# Patient Record
Sex: MALE | Race: WHITE | Employment: OTHER | URBAN - METROPOLITAN AREA
[De-identification: names, ages, dates, MRNs, and addresses within clinical notes are randomized per-mention and may not be internally consistent; named-entity substitution may affect disease eponyms.]

---

## 2021-12-09 ENCOUNTER — OFFICE VISIT (OUTPATIENT)
Dept: INFECTIOUS DISEASES | Age: 81
End: 2021-12-09
Payer: MEDICARE

## 2021-12-09 VITALS
DIASTOLIC BLOOD PRESSURE: 76 MMHG | OXYGEN SATURATION: 99 % | TEMPERATURE: 97.9 F | HEART RATE: 83 BPM | SYSTOLIC BLOOD PRESSURE: 148 MMHG | WEIGHT: 186.8 LBS

## 2021-12-09 DIAGNOSIS — M05.79 RHEUMATOID ARTHRITIS INVOLVING MULTIPLE SITES WITH POSITIVE RHEUMATOID FACTOR (HCC): ICD-10-CM

## 2021-12-09 DIAGNOSIS — Z79.620 INFLIXIMAB (REMICADE) LONG-TERM USE: ICD-10-CM

## 2021-12-09 DIAGNOSIS — Z22.7 TB LUNG, LATENT: Primary | ICD-10-CM

## 2021-12-09 PROCEDURE — G8427 DOCREV CUR MEDS BY ELIG CLIN: HCPCS | Performed by: INTERNAL MEDICINE

## 2021-12-09 PROCEDURE — 4040F PNEUMOC VAC/ADMIN/RCVD: CPT | Performed by: INTERNAL MEDICINE

## 2021-12-09 PROCEDURE — 1036F TOBACCO NON-USER: CPT | Performed by: INTERNAL MEDICINE

## 2021-12-09 PROCEDURE — G8421 BMI NOT CALCULATED: HCPCS | Performed by: INTERNAL MEDICINE

## 2021-12-09 PROCEDURE — G8484 FLU IMMUNIZE NO ADMIN: HCPCS | Performed by: INTERNAL MEDICINE

## 2021-12-09 PROCEDURE — 99203 OFFICE O/P NEW LOW 30 MIN: CPT | Performed by: INTERNAL MEDICINE

## 2021-12-09 PROCEDURE — 1123F ACP DISCUSS/DSCN MKR DOCD: CPT | Performed by: INTERNAL MEDICINE

## 2021-12-09 RX ORDER — VALACYCLOVIR HYDROCHLORIDE 500 MG/1
500 TABLET, FILM COATED ORAL DAILY
COMMUNITY
Start: 2021-08-30

## 2021-12-09 RX ORDER — ZINC GLUCONATE 50 MG
50 TABLET ORAL DAILY
COMMUNITY

## 2021-12-09 RX ORDER — ASPIRIN 81 MG/1
81 TABLET ORAL DAILY
COMMUNITY

## 2021-12-09 RX ORDER — SITAGLIPTIN 100 MG/1
TABLET, FILM COATED ORAL
COMMUNITY
Start: 2021-09-20

## 2021-12-09 RX ORDER — CANDESARTAN 4 MG/1
TABLET ORAL
COMMUNITY
Start: 2021-09-20

## 2021-12-09 RX ORDER — EZETIMIBE 10 MG/1
10 TABLET ORAL DAILY
COMMUNITY
Start: 2021-09-20

## 2021-12-09 RX ORDER — OMEGA-3S/DHA/EPA/FISH OIL/D3 300MG-1000
400 CAPSULE ORAL DAILY
COMMUNITY

## 2021-12-09 NOTE — PROGRESS NOTES
[facility] - Initial Office Consult Note  Today's Date and Time: 12/192021, 3:55 PM    Diagnostic Impression :     1. TB lung, latent    2. Rheumatoid arthritis involving multiple sites with positive rheumatoid factor (HCC)    3. Infliximab (Remicade) long-term use         Recommendations   · Will review laboratory data and X rays from the [facility]  · Will most likely need  mg q day x 9 months and pyridoxine 50 mg a day for 9 months. · LFT as a baseline and repeat one month into treatment. · INH and pyridoxine requested x 9 months after review of CXR on 12-14-21    Chief complaint/reason for consultation:     Chief Complaint   [facility]   [address]     [facility]/ new patient        History of Present Illness:   Nolberto Caraballo is a [de-identified]y.o.-year-old  male who was initially evaluated on 12/9/2021. Patient seen at the request of his rheumatologist, Dr. Kaylee Bello. The patient indicates that he suffers from Rheumatoid arthritis. He was treated by Dr Lia Silverman for many years until Dr Lia Silverman retired. He has received Remicade for 7 years which has stabilized his arthritis. He denied any active complaints. Dr Kaylee Bello assumed his care and requested a TB quantiferon test which reportedly came back positive. This raised concern for latent tuberculosis. A CXR was done at the [facility]. The results are not available at this point. On evaluation he denied active fever, shortness of breath, chills, weakness, fatigue, loss of appetite, weight loss or any other problems. Exam is normal except for rheumatoid deformities. I discussed with the patient and his wife latent TB, the possibility of breakthrough TB because of the immunosuppressive effect of Remicade and the decrease in breakthrough infection by 7 fold with INH x 9 months. Will await laboratory and Chest X ray results from [facility] clinic to confirm latent TB.  Then obtain liver function tests as baseline before starting INH and pyridoxine. I have personally reviewed the past medical history, past surgical history, medications, social history, and family history, and I have updated the database accordingly. Past Medical History:   History reviewed. No pertinent past medical history. Past Surgical  History:   History reviewed. No pertinent surgical history. Medications:     Current Outpatient Medications:     metFORMIN (GLUCOPHAGE) 1000 MG tablet, Take 1,000 mg by mouth 2 times daily (with meals), Disp: , Rfl:     valACYclovir (VALTREX) 500 MG tablet, Take 500 mg by mouth daily, Disp: , Rfl:     JANUVIA 100 MG tablet, , Disp: , Rfl:     candesartan (ATACAND) 4 MG tablet, , Disp: , Rfl:     ezetimibe (ZETIA) 10 MG tablet, Take 10 mg by mouth daily, Disp: , Rfl:     zinc gluconate 50 MG tablet, Take 50 mg by mouth daily, Disp: , Rfl:     aspirin 81 MG EC tablet, Take 81 mg by mouth daily, Disp: , Rfl:     vitamin D3 (CHOLECALCIFEROL) 10 MCG (400 UNIT) TABS tablet, Take 400 Units by mouth daily, Disp: , Rfl:      Social History:     Social History     Socioeconomic History    Marital status:      Spouse name: Not on file    Number of children: Not on file    Years of education: Not on file    Highest education level: Not on file   Occupational History    Not on file   Tobacco Use    Smoking status: Never Smoker    Smokeless tobacco: Never Used   Substance and Sexual Activity    Alcohol use: Not on file    Drug use: Not on file    Sexual activity: Not on file   Other Topics Concern    Not on file   Social History Narrative    Not on file     Social Determinants of Health     Financial Resource Strain:     Difficulty of Paying Living Expenses: Not on file   Food Insecurity:     Worried About Running Out of Food in the Last Year: Not on file    Alecia of Food in the Last Year: Not on file   Transportation Needs:     Lack of Transportation (Medical):  Not on file    Lack of Transportation (Non-Medical): Not on file   Physical Activity:     Days of Exercise per Week: Not on file    Minutes of Exercise per Session: Not on file   Stress:     Feeling of Stress : Not on file   Social Connections:     Frequency of Communication with Friends and Family: Not on file    Frequency of Social Gatherings with Friends and Family: Not on file    Attends Restorationist Services: Not on file    Active Member of [address] [place] [address] or Organizations: Not on file    Attends Club or Organization Meetings: Not on file    Marital Status: Not on file   Intimate Partner Violence:     Fear of Current or Ex-Partner: Not on file    Emotionally Abused: Not on file    Physically Abused: Not on file    Sexually Abused: Not on file   Housing Stability:     Unable to Pay for Housing in the Last Year: Not on file    Number of [place] in the Last Year: Not on file    Unstable Housing in the Last Year: Not on file       Family History:   History reviewed. No pertinent family history. Allergies:   Lansoprazole, Levothyroxine, and Statins     Review of Systems:   Constitutional: No fevers or chills. No systemic complaints  Head: No headaches  Eyes: No double vision or blurry vision. ENT: No sore throat or runny nose. . No hearing loss, tinnitus or vertigo. Cardiovascular: No chest pain or palpitations. No shortness of breath. No OLIVERA  Lung: No shortness of breath or cough. No sputum production  Abdomen: No nausea, vomiting, diarrhea, or abdominal pain. .  Genitourinary: No increased urinary frequency, or dysuria. No hematuria. No suprapubic or CVA pain  Musculoskeletal: No muscle aches or pains. No joint effusions, swelling or deformities. Hx Rheumatoid arthritis. Hematologic: No bleeding or bruising. Neurologic: No headache, weakness, numbness, or tingling.     Physical Examination :   BP (!) 148/76   Pulse 83   Temp 97.9 °F (36.6 °C) (Temporal)   Wt 186 lb 12.8 oz (84.7 kg)   SpO2 99% Comment: rm air at

## 2021-12-10 NOTE — PATIENT INSTRUCTIONS
Dr Wilmar Vang requested that we get CXR and CT from [facility] clinic I called the patient letting the wife know that they need to sign a release form before we can proceed.  She stated understanding and will inform her  of this RD 12-10-21

## 2021-12-13 ENCOUNTER — TELEPHONE (OUTPATIENT)
Dept: INFECTIOUS DISEASES | Age: 81
End: 2021-12-13

## 2021-12-13 NOTE — TELEPHONE ENCOUNTER
called to inform he requested CXR as directed at check out. Imaging in [place] for review. Routing to Sally and Dr Daisy Lovelace to inform of status. No record of CT scan per [facility]         Instructions       Return in about 4 weeks (around 1/6/2022). Dr Daisy Lovelace requested that we get CXR and CT from [facility] clinic I called the patient letting the wife know that they need to sign a release form before we can proceed.  She stated understanding and will inform her  of this RD 12-10-21

## 2021-12-14 ENCOUNTER — HOSPITAL ENCOUNTER (OUTPATIENT)
Age: 81
Discharge: HOME | End: 2021-12-14
Payer: MEDICARE

## 2021-12-14 DIAGNOSIS — Z22.7 TB LUNG, LATENT: ICD-10-CM

## 2021-12-14 LAB
ALBUMIN SERPL-MCNC: 4.6 G/DL (ref 3.5–5.2)
ALBUMIN/GLOBULIN RATIO: 2.1 (ref 1–2.5)
ALP BLD-CCNC: 46 U/L (ref 40–129)
ALT SERPL-CCNC: 38 U/L (ref 5–41)
AST SERPL-CCNC: 29 U/L
BILIRUB SERPL-MCNC: 0.24 MG/DL (ref 0.3–1.2)
BILIRUBIN DIRECT: <0.08 MG/DL
BILIRUBIN, INDIRECT: ABNORMAL MG/DL (ref 0–1)
GLOBULIN: ABNORMAL G/DL (ref 1.5–3.8)
TOTAL PROTEIN: 6.8 G/DL (ref 6.4–8.3)

## 2021-12-14 PROCEDURE — 80076 HEPATIC FUNCTION PANEL: CPT

## 2021-12-14 PROCEDURE — 36415 COLL VENOUS BLD VENIPUNCTURE: CPT

## 2021-12-14 RX ORDER — PYRIDOXINE HCL (VITAMIN B6) 50 MG
50 TABLET ORAL DAILY
Qty: 30 TABLET | Refills: 8 | Status: SHIPPED | OUTPATIENT
Start: 2021-12-14 | End: 2022-12-14

## 2021-12-14 RX ORDER — ISONIAZID 300 MG/1
300 TABLET ORAL DAILY
Qty: 30 TABLET | Refills: 8 | Status: SHIPPED | OUTPATIENT
Start: 2021-12-14 | End: 2022-01-13

## 2021-12-14 NOTE — TELEPHONE ENCOUNTER
Pulled imaging for Dr Myah Patrick to view - Dr Myah Patrick called pt. Mailed lab order to his home.

## 2022-12-08 ENCOUNTER — TELEPHONE (OUTPATIENT)
Dept: PULMONOLOGY | Age: 82
End: 2022-12-08

## 2022-12-08 NOTE — TELEPHONE ENCOUNTER
Spoke to wife and made a follow up appointment. She will call [facility] and see if she can  disks for CT and CXR. IF she can't she will call our office.

## 2022-12-08 NOTE — TELEPHONE ENCOUNTER
Patient called and wants to know if he should still be taking Vitamin B-9 he is out. And if you need him to have a follow up appointment. Please advise.

## 2023-01-10 ENCOUNTER — OFFICE VISIT (OUTPATIENT)
Dept: INFECTIOUS DISEASES | Age: 83
End: 2023-01-10
Payer: MEDICARE

## 2023-01-10 VITALS
DIASTOLIC BLOOD PRESSURE: 70 MMHG | TEMPERATURE: 97.7 F | BODY MASS INDEX: 28.79 KG/M2 | HEIGHT: 67 IN | SYSTOLIC BLOOD PRESSURE: 111 MMHG | WEIGHT: 183.4 LBS | HEART RATE: 80 BPM

## 2023-01-10 DIAGNOSIS — Z22.7 TB LUNG, LATENT: ICD-10-CM

## 2023-01-10 DIAGNOSIS — Z79.620 INFLIXIMAB (REMICADE) LONG-TERM USE: ICD-10-CM

## 2023-01-10 DIAGNOSIS — Z22.7 LATENT TUBERCULOSIS BY BLOOD TEST: Primary | ICD-10-CM

## 2023-01-10 DIAGNOSIS — M05.79 RHEUMATOID ARTHRITIS INVOLVING MULTIPLE SITES WITH POSITIVE RHEUMATOID FACTOR (HCC): ICD-10-CM

## 2023-01-10 PROCEDURE — 1123F ACP DISCUSS/DSCN MKR DOCD: CPT | Performed by: INTERNAL MEDICINE

## 2023-01-10 PROCEDURE — G8484 FLU IMMUNIZE NO ADMIN: HCPCS | Performed by: INTERNAL MEDICINE

## 2023-01-10 PROCEDURE — G8417 CALC BMI ABV UP PARAM F/U: HCPCS | Performed by: INTERNAL MEDICINE

## 2023-01-10 PROCEDURE — 99213 OFFICE O/P EST LOW 20 MIN: CPT | Performed by: INTERNAL MEDICINE

## 2023-01-10 PROCEDURE — 1036F TOBACCO NON-USER: CPT | Performed by: INTERNAL MEDICINE

## 2023-01-10 PROCEDURE — G8427 DOCREV CUR MEDS BY ELIG CLIN: HCPCS | Performed by: INTERNAL MEDICINE

## 2023-01-10 RX ORDER — ISONIAZID 300 MG/1
300 TABLET ORAL DAILY
COMMUNITY

## 2023-01-10 RX ORDER — OMEGA-3 FATTY ACIDS CAP DELAYED RELEASE 1000 MG 1000 MG
3000 CAPSULE DELAYED RELEASE ORAL DAILY
COMMUNITY

## 2023-01-10 NOTE — PROGRESS NOTES
[facility] - Initial Office Consult Note  Today's Date and Time: 01/10/2023, 4:18 PM    Diagnostic Impression :     1. Latent tuberculosis by blood test         Recommendations   CXR in 1 year  F/U in 1 year to discuss results of CXR  PRN if symptoms arise. Chief complaint/reason for consultation:     Chief Complaint   Patient presents with    Frequent Infections     TB latent follow up        History of Present Illness:   Samantha Gary is a 81y.o.-year-old  male who was initially evaluated on 1/10/2023. Patient seen at the request of his rheumatologist, Dr. Nina Mckeon. The patient indicates that he suffers from Rheumatoid arthritis. He was treated by Dr Ivan Rivera for many years until Dr Ivan Rivera retired. He has received Remicade for 7 years which has stabilized his arthritis. He denied any active complaints. Dr Nina Mckeon assumed his care and requested a TB quantiferon test which reportedly came back positive. This raised concern for latent tuberculosis. A CXR was done at the [facility]. The results are not available at this point. On evaluation he denied active fever, shortness of breath, chills, weakness, fatigue, loss of appetite, weight loss or any other problems. Exam is normal except for rheumatoid deformities. I discussed with the patient and his wife latent TB, the possibility of breakthrough TB because of the immunosuppressive effect of Remicade and the decrease in breakthrough infection by 7 fold with INH x 9 months. Will await laboratory and Chest X ray results from [facility] clinic to confirm latent TB. Then obtain liver function tests as baseline before starting INH and pyridoxine. I have personally reviewed the past medical history, past surgical history, medications, social history, and family history, and I have updated the database accordingly.       Office Visit 1/10/23:  Patient presents today for follow up of latent TB infection. Last dose INH 11/2022. No fevers, chills, fatigue, weight loss, chest pain      Past Medical History:   History reviewed. No pertinent past medical history. Past Surgical  History:   No past surgical history on file.     Medications:     Current Outpatient Medications:     Omega-3 Fatty Acids (FISH OIL) 1000 MG CPDR, Take 3,000 mg by mouth daily, Disp: , Rfl:     isoniazid (NYDRAZID) 300 MG tablet, Take 300 mg by mouth daily, Disp: , Rfl:     Multiple Vitamin (MULTIVITAMIN ADULT PO), Take by mouth daily, Disp: , Rfl:     metFORMIN (GLUCOPHAGE) 1000 MG tablet, Take 1,000 mg by mouth 2 times daily (with meals), Disp: , Rfl:     valACYclovir (VALTREX) 500 MG tablet, Take 500 mg by mouth daily, Disp: , Rfl:     JANUVIA 100 MG tablet, , Disp: , Rfl:     candesartan (ATACAND) 4 MG tablet, , Disp: , Rfl:     ezetimibe (ZETIA) 10 MG tablet, Take 10 mg by mouth daily, Disp: , Rfl:     zinc gluconate 50 MG tablet, Take 50 mg by mouth daily, Disp: , Rfl:     aspirin 81 MG EC tablet, Take 81 mg by mouth daily, Disp: , Rfl:     vitamin D3 (CHOLECALCIFEROL) 10 MCG (400 UNIT) TABS tablet, Take 400 Units by mouth daily, Disp: , Rfl:     pyridoxine (RA VITAMIN B-6) 50 MG tablet, Take 1 tablet by mouth daily, Disp: 30 tablet, Rfl: 8     Social History:     Social History     Socioeconomic History    Marital status:      Spouse name: Not on file    Number of children: Not on file    Years of education: Not on file    Highest education level: Not on file   Occupational History    Not on file   Tobacco Use    Smoking status: Never    Smokeless tobacco: Never   Substance and Sexual Activity    Alcohol use: Not on file    Drug use: Not on file    Sexual activity: Not on file   Other Topics Concern    Not on file   Social History Narrative    Not on file     Social Determinants of Health     Financial Resource Strain: Not on file   Food Insecurity: Not on file   Transportation Needs: Not on file   Physical Activity: Not on file   Stress: Not on file   Social Connections: Not on file   Intimate Partner Violence: Not on file   Housing Stability: Not on file       Family History:   No family history on file. Allergies:   Lansoprazole, Levothyroxine, and Statins     Review of Systems:   Constitutional: No fevers or chills. No systemic complaints  Head: No headaches  Eyes: No double vision or blurry vision. ENT: No sore throat or runny nose. . No hearing loss, tinnitus or vertigo. Cardiovascular: No chest pain or palpitations. No shortness of breath. No OLIVERA  Lung: No shortness of breath or cough. No sputum production  Abdomen: No nausea, vomiting, diarrhea, or abdominal pain. .  Genitourinary: No increased urinary frequency, or dysuria. No hematuria. No suprapubic or CVA pain  Musculoskeletal: No muscle aches or pains. No joint effusions, swelling or deformities. Hx Rheumatoid arthritis. Hematologic: No bleeding or bruising. Neurologic: No headache, weakness, numbness, or tingling. Physical Examination :   /70 (Site: Left Upper Arm)   Pulse 80   Temp 97.7 °F (36.5 °C)   Ht 5' 7\" (1.702 m)   Wt 183 lb 6.4 oz (83.2 kg)   BMI 28.72 kg/m²    General Appearance: Awake, alert, and in no apparent distress  Head:  Normocephalic, no trauma  Eyes: Pupils equal, round, reactive, to light and accommodation; extraocular movements intact; sclera anicteric; conjunctivae pink. No embolic phenomena. ENT: Oropharynx clear, without erythema, exudate, or thrush. No tenderness of sinuses. Mouth/throat: mucosa pink and moist. No lesions. Dentition in good repair. Neck:Supple, without lymphadenopathy. Thyroid normal, No bruits. Pulmonary/Chest: Clear to auscultation, without wheezes, rales, or rhonchi. No dullness to percussion. Cardiovascular: Regular rate and rhythm without murmurs, rubs, or gallops. Abdomen: Soft, non tender.  Bowel sounds normal. No organomegaly  All four Extremities: No cyanosis, clubbing, edema, or effusions. Neurologic: No gross sensory or motor deficits. Skin: Warm and dry with good turgor. No signs of peripheral arterial or venous insufficiency. Medical Decision Making:   I have independently reviewed/ordered the following labs:    CBC with Differential:  No results found for: WBC, HGB, HCT, PLT, SEGSPCT, BANDSPCT, LYMPHOPCT, MONOPCT, EOSPCT  BMP:   No results found for: NA, K, CL, CO2, BUN, CREATININE, CA, MG  Hepatic Function Panel:  Lab Results   Component Value Date/Time    PROT 6.8 12/14/2021 04:33 PM    LABALBU 4.6 12/14/2021 04:33 PM    BILIDIR <0.08 12/14/2021 04:33 PM    IBILI Can not be calculated 12/14/2021 04:33 PM    BILITOT 0.24 12/14/2021 04:33 PM    ALKPHOS 46 12/14/2021 04:33 PM    ALT 38 12/14/2021 04:33 PM    AST 29 12/14/2021 04:33 PM     No results found for: RPR  No results found for: HIV  No results found for: BC  No results found for: MUCUS, PH, RBC, TRICHOMONAS, WBC, YEAST, TURBIDITY  No results found for: CREATININE, GLUCOSE, SODIUM, KETONES    Thank you for allowing us to participate in the care of this patient. Please call with questions.     Phyliss Dancer  Pager: [phone] - Office: [phone]

## 2023-01-10 NOTE — PROGRESS NOTES
[facility] - Initial Office Consult Note  Today's Date and Time: 1/10/2023 , 3:55 PM    Diagnostic Impression :     1. Latent tuberculosis by blood test    2. TB lung, latent    3. Infliximab (Remicade) long-term use    4. Rheumatoid arthritis involving multiple sites with positive rheumatoid factor (HCC)           Recommendations     Has completed  mg q day x 9 months and pyridoxine 50 mg a day for 9 months for latent TB. No additional antimicrobial treatment  Yearly CXR while on Remicade  Follow up in one year  Patient to notify the office if symptom should develop in the interim    Chief complaint/reason for consultation:     Chief Complaint   Patient presents with    Frequent Infections     TB latent follow up        History of Present Illness:   Milo Menjivar is a 81y.o.-year-old  male who was evaluated on 1/10/2023. Patient seen at the request of his rheumatologist, Dr. Lynda Altman. The patient indicates that he suffers from Rheumatoid arthritis. He was treated by Dr Brook Hu for many years until Dr Brook Hu retired. He has received Remicade for 7 years which has stabilized his arthritis. He denied any active complaints. Dr Lynda Altman assumed his care and requested a TB quantiferon test which reportedly came back positive. This raised concern for latent tuberculosis. A CXR was done at the [facility]. There were no signs of overt  pulmonary TB. On evaluation he denied active fever, shortness of breath, chills, weakness, fatigue, loss of appetite, weight loss or any other problems. Exam is normal except for rheumatoid deformities. I discussed with the patient and his wife latent TB, the possibility of breakthrough TB because of the immunosuppressive effect of Remicade and the decrease in breakthrough infection by 7 fold with INH x 9 months. Initially we reviewed  laboratory and Chest X ray results from [facility] to confirm latent TB. Obtained liver function tests as baseline and started INH and pyridoxine. Patient took the medication x 9 months without any problems. He has not experienced any progression of prior latent TB. Patient was advised that no additional treatment would be necessary. The risk of overt TB has been decreased but not eliminated. Hence he will need to monitor himself for any changes in his health and have yearly CXR. Patient is agreeable. Prescription for CXR in a year provided and follow up after that. I have personally reviewed the past medical history, past surgical history, medications, social history, and family history, and I have updated the database accordingly. Past Medical History:   History reviewed. No pertinent past medical history. Past Surgical  History:   No past surgical history on file.     Medications:     Current Outpatient Medications:     Omega-3 Fatty Acids (FISH OIL) 1000 MG CPDR, Take 3,000 mg by mouth daily, Disp: , Rfl:     isoniazid (NYDRAZID) 300 MG tablet, Take 300 mg by mouth daily, Disp: , Rfl:     Multiple Vitamin (MULTIVITAMIN ADULT PO), Take by mouth daily, Disp: , Rfl:     metFORMIN (GLUCOPHAGE) 1000 MG tablet, Take 1,000 mg by mouth 2 times daily (with meals), Disp: , Rfl:     valACYclovir (VALTREX) 500 MG tablet, Take 500 mg by mouth daily, Disp: , Rfl:     JANUVIA 100 MG tablet, , Disp: , Rfl:     candesartan (ATACAND) 4 MG tablet, , Disp: , Rfl:     ezetimibe (ZETIA) 10 MG tablet, Take 10 mg by mouth daily, Disp: , Rfl:     zinc gluconate 50 MG tablet, Take 50 mg by mouth daily, Disp: , Rfl:     aspirin 81 MG EC tablet, Take 81 mg by mouth daily, Disp: , Rfl:     vitamin D3 (CHOLECALCIFEROL) 10 MCG (400 UNIT) TABS tablet, Take 400 Units by mouth daily, Disp: , Rfl:     pyridoxine (RA VITAMIN B-6) 50 MG tablet, Take 1 tablet by mouth daily, Disp: 30 tablet, Rfl: 8     Social History:     Social History     Socioeconomic History    Marital status:      Spouse name: Not on file    Number of children: Not on file    Years of education: Not on file    Highest education level: Not on file   Occupational History    Not on file   Tobacco Use    Smoking status: Never    Smokeless tobacco: Never   Substance and Sexual Activity    Alcohol use: Not on file    Drug use: Not on file    Sexual activity: Not on file   Other Topics Concern    Not on file   Social History Narrative    Not on file     Social Determinants of Health     Financial Resource Strain: Not on file   Food Insecurity: Not on file   Transportation Needs: Not on file   Physical Activity: Not on file   Stress: Not on file   Social Connections: Not on file   Intimate Partner Violence: Not on file   Housing Stability: Not on file       Family History:   No family history on file. Allergies:   Lansoprazole, Levothyroxine, and Statins     Review of Systems:   Constitutional: No fevers or chills. No systemic complaints  Head: No headaches  Eyes: No double vision or blurry vision. ENT: No sore throat or runny nose. . No hearing loss, tinnitus or vertigo. Cardiovascular: No chest pain or palpitations. No shortness of breath. No OLIVERA  Lung: No shortness of breath or cough. No sputum production  Abdomen: No nausea, vomiting, diarrhea, or abdominal pain. .  Genitourinary: No increased urinary frequency, or dysuria. No hematuria. No suprapubic or CVA pain  Musculoskeletal: No muscle aches or pains. No current joint effusions, swelling. Has prior  deformities from Rheumatoid arthritis. Hematologic: No bleeding or bruising. Neurologic: No headache, weakness, numbness, or tingling.     Physical Examination :   /70 (Site: Left Upper Arm)   Pulse 80   Temp 97.7 °F (36.5 °C)   Ht 5' 7\" (1.702 m)   Wt 183 lb 6.4 oz (83.2 kg)   BMI 28.72 kg/m²    General Appearance: Awake, alert, and in no apparent distress  Head:  Normocephalic, no trauma  Eyes: Pupils equal, round, reactive, to light and accommodation; extraocular movements intact; sclera anicteric; conjunctivae pink. No embolic phenomena. ENT: Oropharynx clear, without erythema, exudate, or thrush. No tenderness of sinuses. Mouth/throat: mucosa pink and moist. No lesions. Dentition in good repair. Neck:Supple, without lymphadenopathy. Thyroid normal, No bruits. Pulmonary/Chest: Clear to auscultation, without wheezes, rales, or rhonchi. No dullness to percussion. Cardiovascular: Regular rate and rhythm without murmurs, rubs, or gallops. Abdomen: Soft, non tender. Bowel sounds normal. No organomegaly  All four Extremities: No cyanosis, clubbing, edema, or effusions. Has deformities from Rheumatoid arthritis. Neurologic: No gross sensory or motor deficits. Skin: Warm and dry with good turgor. No signs of peripheral arterial or venous insufficiency. Medical Decision Making:   I have independently reviewed/ordered the following labs:    CBC with Differential:  No results found for: WBC, HGB, HCT, PLT, SEGSPCT, BANDSPCT, LYMPHOPCT, MONOPCT, EOSPCT  BMP:   No results found for: NA, K, CL, CO2, BUN, CREATININE, CA, MG  Hepatic Function Panel:  Lab Results   Component Value Date/Time    PROT 6.8 12/14/2021 04:33 PM    LABALBU 4.6 12/14/2021 04:33 PM    BILIDIR <0.08 12/14/2021 04:33 PM    IBILI Can not be calculated 12/14/2021 04:33 PM    BILITOT 0.24 12/14/2021 04:33 PM    ALKPHOS 46 12/14/2021 04:33 PM    ALT 38 12/14/2021 04:33 PM    AST 29 12/14/2021 04:33 PM     No results found for: RPR  No results found for: HIV  No results found for: BC  No results found for: MUCUS, PH, RBC, TRICHOMONAS, WBC, YEAST, TURBIDITY  No results found for: CREATININE, GLUCOSE, SODIUM, KETONES    Thank you for allowing us to participate in the care of this patient. Please call with questions.     Wyatt Ford MD  Pager: [phone] - Office: [phone]